# Patient Record
Sex: MALE | Race: OTHER | HISPANIC OR LATINO | URBAN - METROPOLITAN AREA
[De-identification: names, ages, dates, MRNs, and addresses within clinical notes are randomized per-mention and may not be internally consistent; named-entity substitution may affect disease eponyms.]

---

## 2017-05-31 ENCOUNTER — EMERGENCY (EMERGENCY)
Facility: HOSPITAL | Age: 32
LOS: 1 days | Discharge: HOME | End: 2017-05-31
Attending: EMERGENCY MEDICINE
Payer: SELF-PAY

## 2017-05-31 VITALS
OXYGEN SATURATION: 99 % | SYSTOLIC BLOOD PRESSURE: 119 MMHG | HEIGHT: 63 IN | HEART RATE: 98 BPM | TEMPERATURE: 100 F | RESPIRATION RATE: 16 BRPM | WEIGHT: 162.92 LBS | DIASTOLIC BLOOD PRESSURE: 85 MMHG

## 2017-05-31 PROCEDURE — 99285 EMERGENCY DEPT VISIT HI MDM: CPT | Mod: 25

## 2017-05-31 PROCEDURE — 99053 MED SERV 10PM-8AM 24 HR FAC: CPT

## 2017-05-31 RX ORDER — SODIUM CHLORIDE 9 MG/ML
1000 INJECTION INTRAMUSCULAR; INTRAVENOUS; SUBCUTANEOUS ONCE
Qty: 0 | Refills: 0 | Status: COMPLETED | OUTPATIENT
Start: 2017-05-31 | End: 2017-05-31

## 2017-05-31 NOTE — ED PROVIDER NOTE - DETAILS:
S.O. from Dr. zelaya f/u labs which are wnl. Pt is well appearing walking with normal gait, stable for discharge and follow up with medical doctor. Pt educated on care and need for follow up. Discussed anticipatory guidance and return precautions. Questions answered. I had a detailed discussion with the patient and/or guardian regarding the historical points, exam findings, and any diagnostic results supporting the discharge diagnosis.. Pt is w/o complaints. Denies suicidal ideation, cooperative and w/ supportive adult family member.

## 2017-05-31 NOTE — ED PROVIDER NOTE - OBJECTIVE STATEMENT
30 y/o M with no significant PMHx presents to ED c/o sudden onset dizziness with L sided numbness in face and arm today at 2100 while sitting. Pt denies any vomiting, diarrhea, or any other complaints. NKDA. Pt notes that he is unemployed, and would like to work but has to get his GED first. He lives with his father.    After private conversation with sister, who is at bedside, it is learned that pt's mother  9 months ago. Pt's father is currently visiting someone and he is alone at home. Pt's sister believes that he had a panic attack. Denies SI or HI. 30 y/o M with no significant PMHx presents to ED c/o sudden onset dizziness with L sided numbness in face and arm today at 2100 while sitting at home with his sister. Pt denies any vomiting, diarrhea, or any other complaints. NKDA. Pt notes that he is unemployed, and would like to work but has to get his GED first. He lives with his father. Denies chest pain, SOB, HA, unilateral weakness.  After private conversation with sister, who is at bedside, it is learned that pt's mother  9 months ago. Pt's father is currently visiting someone and he is alone at home. Pt's sister believes that he had a panic attack. Denies SI or HI.

## 2017-05-31 NOTE — ED PROVIDER NOTE - NS ED MD SCRIBE ATTENDING SCRIBE SECTIONS
PAST MEDICAL/SURGICAL/SOCIAL HISTORY/HIV/VITAL SIGNS( Pullset)/REVIEW OF SYSTEMS/PHYSICAL EXAM/DISPOSITION/HISTORY OF PRESENT ILLNESS

## 2017-06-01 LAB
ALBUMIN SERPL ELPH-MCNC: 4.5 G/DL — SIGNIFICANT CHANGE UP (ref 3.5–5)
ALP SERPL-CCNC: 73 U/L — SIGNIFICANT CHANGE UP (ref 40–120)
ALT FLD-CCNC: 98 U/L DA — HIGH (ref 10–60)
ANION GAP SERPL CALC-SCNC: 7 MMOL/L — SIGNIFICANT CHANGE UP (ref 5–17)
AST SERPL-CCNC: 41 U/L — HIGH (ref 10–40)
BASOPHILS # BLD AUTO: 0.1 K/UL — SIGNIFICANT CHANGE UP (ref 0–0.2)
BASOPHILS NFR BLD AUTO: 0.8 % — SIGNIFICANT CHANGE UP (ref 0–2)
BILIRUB SERPL-MCNC: 0.5 MG/DL — SIGNIFICANT CHANGE UP (ref 0.2–1.2)
BUN SERPL-MCNC: 12 MG/DL — SIGNIFICANT CHANGE UP (ref 7–18)
CALCIUM SERPL-MCNC: 9.5 MG/DL — SIGNIFICANT CHANGE UP (ref 8.4–10.5)
CHLORIDE SERPL-SCNC: 101 MMOL/L — SIGNIFICANT CHANGE UP (ref 96–108)
CO2 SERPL-SCNC: 30 MMOL/L — SIGNIFICANT CHANGE UP (ref 22–31)
CREAT SERPL-MCNC: 1.24 MG/DL — SIGNIFICANT CHANGE UP (ref 0.5–1.3)
EOSINOPHIL # BLD AUTO: 0 K/UL — SIGNIFICANT CHANGE UP (ref 0–0.5)
EOSINOPHIL NFR BLD AUTO: 0.1 % — SIGNIFICANT CHANGE UP (ref 0–6)
GLUCOSE SERPL-MCNC: 131 MG/DL — HIGH (ref 70–99)
HCT VFR BLD CALC: 54.4 % — HIGH (ref 39–50)
HGB BLD-MCNC: 17.9 G/DL — HIGH (ref 13–17)
LYMPHOCYTES # BLD AUTO: 2.1 K/UL — SIGNIFICANT CHANGE UP (ref 1–3.3)
LYMPHOCYTES # BLD AUTO: 23.6 % — SIGNIFICANT CHANGE UP (ref 13–44)
MAGNESIUM SERPL-MCNC: 2.6 MG/DL — SIGNIFICANT CHANGE UP (ref 1.6–2.6)
MCHC RBC-ENTMCNC: 28.4 PG — SIGNIFICANT CHANGE UP (ref 27–34)
MCHC RBC-ENTMCNC: 32.9 GM/DL — SIGNIFICANT CHANGE UP (ref 32–36)
MCV RBC AUTO: 86.2 FL — SIGNIFICANT CHANGE UP (ref 80–100)
MONOCYTES # BLD AUTO: 0.5 K/UL — SIGNIFICANT CHANGE UP (ref 0–0.9)
MONOCYTES NFR BLD AUTO: 5.2 % — SIGNIFICANT CHANGE UP (ref 2–14)
NEUTROPHILS # BLD AUTO: 6.2 K/UL — SIGNIFICANT CHANGE UP (ref 1.8–7.4)
NEUTROPHILS NFR BLD AUTO: 70.2 % — SIGNIFICANT CHANGE UP (ref 43–77)
PLATELET # BLD AUTO: 307 K/UL — SIGNIFICANT CHANGE UP (ref 150–400)
POTASSIUM SERPL-MCNC: 4.1 MMOL/L — SIGNIFICANT CHANGE UP (ref 3.5–5.3)
POTASSIUM SERPL-SCNC: 4.1 MMOL/L — SIGNIFICANT CHANGE UP (ref 3.5–5.3)
PROT SERPL-MCNC: 9.1 G/DL — HIGH (ref 6–8.3)
RBC # BLD: 6.31 M/UL — HIGH (ref 4.2–5.8)
RBC # FLD: 11.4 % — SIGNIFICANT CHANGE UP (ref 10.3–14.5)
SODIUM SERPL-SCNC: 138 MMOL/L — SIGNIFICANT CHANGE UP (ref 135–145)
WBC # BLD: 8.8 K/UL — SIGNIFICANT CHANGE UP (ref 3.8–10.5)
WBC # FLD AUTO: 8.8 K/UL — SIGNIFICANT CHANGE UP (ref 3.8–10.5)

## 2017-06-01 PROCEDURE — 80053 COMPREHEN METABOLIC PANEL: CPT

## 2017-06-01 PROCEDURE — 83735 ASSAY OF MAGNESIUM: CPT

## 2017-06-01 PROCEDURE — 85027 COMPLETE CBC AUTOMATED: CPT

## 2017-06-01 PROCEDURE — 99283 EMERGENCY DEPT VISIT LOW MDM: CPT | Mod: 25

## 2017-06-01 PROCEDURE — 93005 ELECTROCARDIOGRAM TRACING: CPT

## 2017-06-01 PROCEDURE — 82550 ASSAY OF CK (CPK): CPT

## 2017-06-01 RX ADMIN — SODIUM CHLORIDE 2000 MILLILITER(S): 9 INJECTION INTRAMUSCULAR; INTRAVENOUS; SUBCUTANEOUS at 00:30

## 2017-06-04 DIAGNOSIS — F41.9 ANXIETY DISORDER, UNSPECIFIED: ICD-10-CM

## 2017-06-04 DIAGNOSIS — Z88.0 ALLERGY STATUS TO PENICILLIN: ICD-10-CM

## 2022-01-12 NOTE — ED PROVIDER NOTE - MEDICAL DECISION MAKING DETAILS
01/12/22 1143   Referral Data   Referral Name self   Referral Reason Drug/Alcohol [facility] of [address]    Readmission Root Cause   30 Day Readmission No   Patient Information   Mental Status Alert   Primary Caregiver Self   Support System Immediate family   Legal Information   Legal Issues pt denies current  2 past [address] Proxy Appointed No   Activities of Daily Living Prior to Admission   Functional Status Independent   Assistive Device No device needed   Living Arrangement Lives with someone   Ambulation Independent   Access to Firearms   Access to Firearms No  (pt denies)   Income [address] [facility]   [employer]   [place] [employer] of Transport to Appts: [employer]     Pt is a 53year old single male who was admitted to the detox unit for alcohol withdrawal  Pt had self presented at the [facility] ED requesting detox due to concerns regarding possible seizures  Pt's name, date of birth, home address, and telephone number were verified  Pt was informed of case management role and the purpose of the completion of intake with case management  Intake was completed by room phone due to pt positive covid diagnosis  Pt presented as cooperative and engaged during intake  Pt report he had been consuming about a pint of vodka daily along with some beers at times  Pt reports last use 1/10/2022 of 1 pint vodka and first use at age 14  Pt reports he smoke marijuana about once monthly with last use 1/10/2022 and first use at age 24  Pt reports his longest period of abstinence was few days  Pt reports previous inpatient treatment, 9 months ago, previous outpatient, and previous 12 step meeting attendance  Pt reports current withdrawal of current tremors and ambulatory dysfunction  Pt reports historical withdrawal symptoms of sweats, vomiting, nausea, Dt's, hallucinations, and seizures   Pt reports a family history of substance use issues, father alcohol  AUDIT: no score  PAWSS: 7  UDS: +THC  Ethanol Lvl in ED: <3    Pt denied any mental health diagnosis or history  Pt denied SI or HI, denied current AH/VH  Pt denied any history of abuse or trauma  Pt denied any family history of mental illness  Pt has current medical condition of COVID + (asymptomatic) and Alcoholic cirrhosis of liver ([address] )  Pt reports he currently does not have a PCP and the PCP listed on his insurance is too far from his home  Pt agreed to be referred to [facility] in [place] and agreed to contact his insurance to make this change  Pt provided verbal ASIM for [facility]  Pt has medical insurance of [address] [place] [address] and provided verbal consent for ASIM  Pt has preferred pharmacy as [facility]  Pt denied current legal issues but reports history of 2 DUI's  Pt reports he is currently unemployed as a   Pt reports he has a high school diploma  Pt states he does not have a license or car and relies on walking or public transportation for linkage to appointments  Pt reports he is currently residing between his sister's home and a friend's home  Pt provided a verbal consent for an ASIM for his sister, Parminder Christian, but requested she not be contacted at this time  Pt states information can be released to her if she were to call  Pt and Cm completed relapse prevention plan  Pt and Cm signed plan and pt received a copy of this plan  Pt indicates that he is interested in outpatient treatment at this time but is uncertain of if he would be residing again with his sister or moving with his cousin out of the area  Pt agreed to discuss this plan with family and then again discuss this with cm  Pt presents with some struggles regarding his ability to recognize the severity of his use and expresses that much of his use is due to his loneliness and feelings of worthlessness due to loss of job and home   Pt appears to rely on family and can benefit from increasing this connection  Pt can benefit from continued detox and referral to further treatment after detox  Some goals for pt during his stay at detox will be continuing to receive medical care, discussing aftercare plans with family, and developing a structured, comprehesive aftercare plan  Pt presents in the contemplation stage of change at this time  Patient with L sided numbness and tingling, normal neuro exam including sensation to light touch. Likely anxiety, ekg is normal, sister states under family stress, no SI/HI. Check labs to r/o electrolyte abnormality, does not need CT at this time as do not suspect stroke. No risk factors.

## 2023-09-20 NOTE — ED PROVIDER NOTE - NEUROLOGICAL SPEECH
clear SSKI Counseling:  I discussed with the patient the risks of SSKI including but not limited to thyroid abnormalities, metallic taste, GI upset, fever, headache, acne, arthralgias, paraesthesias, lymphadenopathy, easy bleeding, arrhythmias, and allergic reaction.

## 2024-03-28 NOTE — ED PROVIDER NOTE - CONDUCTED A DETAILED DISCUSSION WITH PATIENT AND/OR GUARDIAN REGARDING, MDM
bilateral upper extremity ROM was WNL (within normal limits)/bilateral lower extremity was ROM was WNL (within normal limits)
return to ED if symptoms worsen, persist or questions arise/lab results/need for outpatient follow-up